# Patient Record
Sex: FEMALE | Race: WHITE | Employment: UNEMPLOYED | URBAN - METROPOLITAN AREA
[De-identification: names, ages, dates, MRNs, and addresses within clinical notes are randomized per-mention and may not be internally consistent; named-entity substitution may affect disease eponyms.]

---

## 2019-05-23 ENCOUNTER — TELEPHONE (OUTPATIENT)
Dept: PEDIATRIC UROLOGY | Age: 29
End: 2019-05-23

## 2019-05-23 NOTE — TELEPHONE ENCOUNTER
Summary of several calls. Spoke with physician approximately one week ago who was requesting appointment with Dr. Jewel Britton for Lisha Celestin. Advised him that Dr. Jewel Britton is retiring and unable to see Lisha Celestin. Referred to Ilana and phone number given. Lisha Celestin left a message on Monday that she wanted an appointment with Dr. Jewel Britton and stated that her doctor called here, spoke with Dr. Jewel Britton and he agreed to see her. Discussed this with Dr. Jewel Britton yesterday to verify if this did happen. He stated he had not spoken with her physician and did not agree to see Lisha Celestin. Received message overnight from a Dr. Simon Reich phone [phone]. Returned call. Dr. Simon Reich advised Dr. Jewel Britton is retiring. Dr. Simon Reich said that he found out that Lishaluis Celestin has been seeing Dr. Deysi Turcios and they called him yesterday. So they are all set. Phoned Lisha Celestin to advise her that Dr. Jewel Britton was unable to see her. She spoke for approximately 20 minutes about her past medical problems and current concerns. Lisha Celestin is currently admittied to [facility] in [place] for pyelonephritis. She was very insistent about talking to Dr. Jewel Britton as he \"made her\" and saved her life. No one knows what to do with her now including Dr. Deysi Turcios. I assured her that Dr. Deysi Turcios was very capable of taking care of her. Dr. Jewel Britton would not be able to help her at this time. She did say that Dr. Deysi Turcios said \" Zhang Rastafari just don't know what to do with you. \" Lisha Celestin had evidently interpreted this statement as Dr. Deysi Turcios didn't have the knowledge to medically treat her. Lisha Celestin also relayed information that would be inconsistent with what would be known to be likely. Such as Dr. Jewel Britton operated on her when he was a resident and Dr. Deysi Turcios delivered her baby and had obtained custody of her due to parental neglect. Lisha Celestin wanted to know who Robbie Clement had trained and was taking over for him.  I told her that there were no pediatric urologists who could see her but that Dr. Ofe Landry adult patients saw physicians at [facility]. Phone number given. Costa Rockwell insisted that Dr. Josiane Fernando be told that she wanted to talk to him. I told her that I would give him the message.

## 2019-06-03 NOTE — TELEPHONE ENCOUNTER
Dr. Yokasta Muñoz clarified that he will talk to Modesto Mckeon if she calls back while he is still working. He can not provide her with care at this time.